# Patient Record
Sex: FEMALE
[De-identification: names, ages, dates, MRNs, and addresses within clinical notes are randomized per-mention and may not be internally consistent; named-entity substitution may affect disease eponyms.]

---

## 2021-04-09 ENCOUNTER — TRANSCRIPTION ENCOUNTER (OUTPATIENT)
Age: 56
End: 2021-04-09

## 2021-04-09 ENCOUNTER — APPOINTMENT (OUTPATIENT)
Dept: NEUROLOGY | Facility: CLINIC | Age: 56
End: 2021-04-09
Payer: COMMERCIAL

## 2021-04-09 VITALS
OXYGEN SATURATION: 98 % | SYSTOLIC BLOOD PRESSURE: 124 MMHG | DIASTOLIC BLOOD PRESSURE: 70 MMHG | WEIGHT: 136 LBS | HEART RATE: 64 BPM | BODY MASS INDEX: 25.03 KG/M2 | TEMPERATURE: 97.3 F | HEIGHT: 62 IN

## 2021-04-09 DIAGNOSIS — Z78.9 OTHER SPECIFIED HEALTH STATUS: ICD-10-CM

## 2021-04-09 DIAGNOSIS — Z82.0 FAMILY HISTORY OF EPILEPSY AND OTHER DISEASES OF THE NERVOUS SYSTEM: ICD-10-CM

## 2021-04-09 DIAGNOSIS — G44.40 DRUG-INDUCED HEADACHE, NOT ELSEWHERE CLASSIFIED, NOT INTRACTABLE: ICD-10-CM

## 2021-04-09 DIAGNOSIS — Z86.59 PERSONAL HISTORY OF OTHER MENTAL AND BEHAVIORAL DISORDERS: ICD-10-CM

## 2021-04-09 PROCEDURE — 99204 OFFICE O/P NEW MOD 45 MIN: CPT

## 2021-04-09 PROCEDURE — 99072 ADDL SUPL MATRL&STAF TM PHE: CPT

## 2021-04-09 NOTE — DISCUSSION/SUMMARY
[FreeTextEntry1] : 55-year-old woman who is here for initial consultation of chronic migraine without aura, medication overuse headache and perimenopausal migraine.  We had a lengthy discussion regarding prophylactic and abortive treatment.  Patient is not clear if she was on magnesium oxide in the past.  Patient will contact me after checking her medicine cabinet.  If she was unable to tolerate magnesium oxide will consider propranolol as prophylactic medication.  Patient will be started on sumatriptan as needed.  Side effects of the medication was discussed with the patient in detail.  Patient will follow up with me in a couple of weeks.\par \par I spent the time noted on the day of this patient encounter preparing for, providing and documenting the above E/M service and counseling and educate patient on differential, workup, disease course, and treatment/management. Education was provided to the patient during this encounter. All questions and concerns were answered and addressed in detail. The patient verbalized understanding and agreed to plan. Patient was advised to continue to monitor for neurologic symptoms and to notify my office or go to the nearest emergency room if there are any changes. Any orders/referrals and communications were provided as well. \par Side effects of the above medications were discussed in detail including but not limited to applicable black box warning and teratogenicity as appropriate. \par Patient was advised to bring previous records to my office. \par \par \par

## 2021-04-09 NOTE — HISTORY OF PRESENT ILLNESS
[FreeTextEntry1] : 55-year-old woman who is here for initial consultation of migraine without aura since her teens. \par Location right or left side of her head\par Quality pulsating or pressure\par Severity 2-10 out of 10\par Duration 1 hour if she takes her medication\par She has tried Tylenol or Advil however Excedrin usually helps her headaches.  If her severity is high she will constantly take the Excedrin\par Associated with nausea, photo phobia and phonophobia, no vomiting or TACs\par Frequency in 2019 was once every week with some correlation with her menses.  In 2000 patient experienced around 2 headaches a month.  About 3 months ago she started experiencing headache almost every day.  Patient may be perimenopausal.  No other triggers were identified.  Patient states that she has seen a previous neurologist who tried gabapentin 100 mg daily but it only partially helped

## 2021-04-09 NOTE — PHYSICAL EXAM
[General Appearance - Alert] : alert [Oriented To Time, Place, And Person] : oriented to person, place, and time [Person] : oriented to person [Place] : oriented to place [Time] : oriented to time [Short Term Intact] : short term memory intact [Fluency] : fluency intact [Current Events] : adequate knowledge of current events [Cranial Nerves Optic (II)] : visual acuity intact bilaterally,  visual fields full to confrontation, pupils equal round and reactive to light [Cranial Nerves Oculomotor (III)] : extraocular motion intact [Cranial Nerves Vestibulocochlear (VIII)] : hearing was intact bilaterally [Motor Tone] : muscle tone was normal in all four extremities [Cranial Nerves Accessory (XI - Cranial And Spinal)] : head turning and shoulder shrug symmetric [Motor Strength] : muscle strength was normal in all four extremities [Sensation Tactile Decrease] : light touch was intact [Abnormal Walk] : normal gait [Coordination - Dysmetria Impaired Finger-to-Nose Bilateral] : not present [1+] : Patella left 1+

## 2021-04-12 ENCOUNTER — TRANSCRIPTION ENCOUNTER (OUTPATIENT)
Age: 56
End: 2021-04-12

## 2021-04-13 ENCOUNTER — TRANSCRIPTION ENCOUNTER (OUTPATIENT)
Age: 56
End: 2021-04-13

## 2021-04-13 ENCOUNTER — APPOINTMENT (OUTPATIENT)
Dept: NEUROLOGY | Facility: CLINIC | Age: 56
End: 2021-04-13

## 2021-06-23 ENCOUNTER — APPOINTMENT (OUTPATIENT)
Dept: NEUROLOGY | Facility: CLINIC | Age: 56
End: 2021-06-23
Payer: COMMERCIAL

## 2021-06-23 VITALS — DIASTOLIC BLOOD PRESSURE: 60 MMHG | SYSTOLIC BLOOD PRESSURE: 120 MMHG | HEART RATE: 76 BPM

## 2021-06-23 PROCEDURE — 99214 OFFICE O/P EST MOD 30 MIN: CPT

## 2021-06-23 PROCEDURE — 99072 ADDL SUPL MATRL&STAF TM PHE: CPT

## 2021-06-23 RX ORDER — SUMATRIPTAN 25 MG/1
25 TABLET, FILM COATED ORAL
Qty: 1 | Refills: 1 | Status: DISCONTINUED | COMMUNITY
Start: 2021-04-09 | End: 2021-06-23

## 2021-06-23 RX ORDER — PROPRANOLOL HYDROCHLORIDE 60 MG/1
60 CAPSULE, EXTENDED RELEASE ORAL
Qty: 30 | Refills: 0 | Status: DISCONTINUED | COMMUNITY
Start: 2021-04-12 | End: 2021-06-23

## 2021-06-23 NOTE — PHYSICAL EXAM
[General Appearance - Alert] : alert [Oriented To Time, Place, And Person] : oriented to person, place, and time [Person] : oriented to person [Place] : oriented to place [Time] : oriented to time [Short Term Intact] : short term memory intact [Fluency] : fluency intact [Current Events] : adequate knowledge of current events [Cranial Nerves Optic (II)] : visual acuity intact bilaterally,  visual fields full to confrontation, pupils equal round and reactive to light [Cranial Nerves Oculomotor (III)] : extraocular motion intact [Cranial Nerves Vestibulocochlear (VIII)] : hearing was intact bilaterally [Cranial Nerves Accessory (XI - Cranial And Spinal)] : head turning and shoulder shrug symmetric [Motor Tone] : muscle tone was normal in all four extremities [Motor Strength] : muscle strength was normal in all four extremities [Sensation Tactile Decrease] : light touch was intact [Abnormal Walk] : normal gait [Coordination - Dysmetria Impaired Finger-to-Nose Bilateral] : not present [1+] : Patella left 1+ [FreeTextEntry5] : Tinel and Phalen were negative

## 2021-06-23 NOTE — HISTORY OF PRESENT ILLNESS
[FreeTextEntry1] : 55-year-old woman who is here for initial consultation of migraine without aura since her teens. \par Location right or left side of her head\par Quality pulsating or pressure\par Severity 2-10 out of 10\par Duration 1 hour if she takes her medication\par She has tried Tylenol or Advil however Excedrin usually helps her headaches.  If her severity is high she will constantly take the Excedrin\par Associated with nausea, photo phobia and phonophobia, no vomiting or TACs\par Frequency in 2019 was once every week with some correlation with her menses.  In 2000 patient experienced around 2 headaches a month.  About 3 months ago she started experiencing headache almost every day.  Patient may be perimenopausal.  No other triggers were identified.  Patient states that she has seen a previous neurologist who tried gabapentin 100 mg daily but it only partially helped\par \par Interval history: Patient is here for follow-up of her headache that did not respond to Imitrex.  Patient's headache returned after 2 hours and even after taking the second dose she did not experience complete relief.  Surprisingly Excedrin worked.  Since her last visit with me in April patient had 2 headaches.  Patient never started the propranolol because she does not want to take prophylactic medication.\par \par Patient states that she has at least a year history of numbness in the right hand that radiates to the forearm.  Patient points to the posterior aspect of her forearm.  Patient is right-handed.  The symptoms sometimes wakes her up at night and she feels that she needs to shake the hand for relief.  Patient does not experience any color or temperature changes.

## 2021-06-23 NOTE — DISCUSSION/SUMMARY
[FreeTextEntry1] : 55-year-old woman who is here for follow-up of her chronic migraine which did not respond to sumatriptan.  Patient will try rizatriptan as needed.  Side effects were discussed with the patient in detail.  Patient is not having the headache frequently enough to warrant prophylactic treatment.  Patient had new symptom of numbness in her right arm that has been going on since at least a year ago.  This is likely median neuropathy.  We will have her return for nerve conduction and EMG testing.\par \par I spent the time noted on the day of this patient encounter preparing for, providing and documenting the above E/M service and counseling and educate patient on differential, workup, disease course, and treatment/management. Education was provided to the patient during this encounter. All questions and concerns were answered and addressed in detail. The patient verbalized understanding and agreed to plan. Patient was advised to continue to monitor for neurologic symptoms and to notify my office or go to the nearest emergency room if there are any changes. Any orders/referrals and communications were provided as well. \par Side effects of the above medications were discussed in detail including but not limited to applicable black box warning and teratogenicity as appropriate. \par Patient was advised to bring previous records to my office. \par \par \par

## 2021-08-04 ENCOUNTER — APPOINTMENT (OUTPATIENT)
Dept: NEUROLOGY | Facility: CLINIC | Age: 56
End: 2021-08-04
Payer: COMMERCIAL

## 2021-08-04 PROCEDURE — 95886 MUSC TEST DONE W/N TEST COMP: CPT

## 2021-08-04 PROCEDURE — 95910 NRV CNDJ TEST 7-8 STUDIES: CPT

## 2021-08-04 NOTE — PROCEDURE
[FreeTextEntry1] :   \par [facility]\par [facility]\par Neurology and Electrophysiology\par \par Nerve Conduction & EMG Report\par \par \par   \par Full Name:	Ry Saini	Gender:	Female\par MRN:	[id number]	YOB: 1965\par   \par Visit Date:	8/4/2021 10:37\par Age:	55 Years\par Examining Physician:	Steve Louis MD\par Height:	5 feet 2 inch\par Weight:	136 lbs\par   \par ________________________________________\par \par Conclusion: \par \par Sensory nerve conductions\par Right median sensory nerve action potential had prolonged latency, normal amplitude and decreased conduction velocity.\par \par Left median sensory nerve action potential had normal latency, normal amplitude and decreased conduction velocity.\par \par Right ulnar sensory nerve action potential had normal latency, normal amplitude and normal conduction velocity.\par \par Right radial sensory nerve action potential had normal latency, normal amplitude and normal conduction velocity.\par Motor nerve conductions\par Right median nerve compound motor action potential had normal latency, normal amplitude and normal conduction velocity.\par \par Left median nerve compound motor action potential had normal latency, normal amplitude and normal conduction velocity.\par \par Right ulnar nerve compound motor action potential had normal latency, normal amplitude and normal conduction velocity.\par \par F wave latency for bilateral median nerves were within normal limits.\par Right ulnar f wave latency was within normal limits. \par Needle EMG\par Needle EMG was performed using a disposable concentric needle in the following muscles: \par Deltoid, biceps, edc, fdi, APB had no spontaneous activity and normal motor units. \par \par Summary: abnormal study. There is electrophysiologic evidence of right more than left median neuropathy by sensory criteria only. There is no evidence of large fiber neuropathy or cervical radiculopathy. She was told by previous neurologist that she has median neuropathy. She was advised to wear wrist splints (without metal backing) on a regular basis and followup in 4 months.  Clinical and radiologic correlation is advised. \par Thank you for the consult,\par Steve Louis MD\par Diplomat, American Board of Neurology and Psychiatry\par \par ________________________________________\par  \par    \par \par   \par Sensory NCS\par   \par Nerve / Sites	Rec. Site	Onset Lat	Peak Lat	NP Amp	PP Amp	Segments	Distance	Velocity\par 		ms	ms	µV	µV		cm	m/s\par R Median - Dig II (Antidromic)\par    Wrist	Index	3.40	4.33	27.0	47.9	Wrist - Index	11	32\par L Median - Dig II (Antidromic)\par    Wrist	Index	2.77	3.54	34.1	52.0	Wrist - Index	11	40\par R Ulnar - Dig V (Antidromic)\par    Wrist	Dig V	2.10	2.83	46.0	53.1	Wrist - Dig V	11	52\par R Radial - Superficial (Antidromic)\par    Forearm	Wrist	1.15	1.69	22.1	42.6	Forearm - Wrist	7	61\par   \par Motor NCS\par   \par Nerve / Sites	Muscle	Latency	Amplitude	Rel Amp	Segments	Distance	Lat Diff	Velocity	Durat\par 		ms	mV	%		cm	ms	m/s	ms\par R Median - APB\par    Wrist	APB	3.63	11.4		Wrist - APB	8			6.33\par    Elbow	APB	7.35	11.0	96.4	Elbow - Wrist	19	3.73	51	6.54\par L Median - APB\par    Wrist	APB	3.17	13.5		Wrist - APB	8			6.73\par    Elbow	APB	7.00	13.4	99.2	Elbow - Wrist	20	3.83	52	7.02\par R Ulnar - ADM\par    Wrist	ADM	2.00	12.9	100	Wrist - ADM	8			6.88\par    B.Elbow	ADM	5.65	12.9	100	B.Elbow - Wrist	20	3.65	55	7.19\par    A.Elbow	ADM	7.38	12.8	98.8	A.Elbow - B.Elbow	9	1.73	52	7.19\par   \par F  Wave\par   \par Nerve	F min\par 	ms\par R Median - APB	25.8\par R Ulnar - ADM	25.2\par L Median - APB	26.6\par   \par EMG Summary Table	\par 	Spontaneous	MUAP	Recruitment\par Muscle	Nerve	Roots	IA	Fib	PSW	Fasc	Comments	Amp	Dur.	PPP	Pattern\par R. Deltoid	Axillary	C5-C6	N	None	None	None	None	N	N	N	N\par R. Biceps brachii	Musculocutaneous	C5-C6	N	None	None	None	None	N	N	N	N\par R. Extensor digitorum communis	Radial	C7-C8	N	None	None	None	None	N	N	N	N\par R. First dorsal interosseous	Ulnar	C8-T1	N	None	None	None	None	N	N	N	N\par R. Abductor pollicis brevis	Median	C8-T1	N	None	None	None	None	N	N	N	N\par                               \par  \par

## 2021-12-06 ENCOUNTER — APPOINTMENT (OUTPATIENT)
Dept: NEUROLOGY | Facility: CLINIC | Age: 56
End: 2021-12-06
Payer: COMMERCIAL

## 2021-12-06 VITALS
DIASTOLIC BLOOD PRESSURE: 82 MMHG | WEIGHT: 136 LBS | BODY MASS INDEX: 25.03 KG/M2 | HEART RATE: 59 BPM | SYSTOLIC BLOOD PRESSURE: 125 MMHG | HEIGHT: 62 IN

## 2021-12-06 DIAGNOSIS — G43.829 MENSTRUAL MIGRAINE, NOT INTRACTABLE, W/OUT STATUS MIGRAINOSUS: ICD-10-CM

## 2021-12-06 DIAGNOSIS — S54.22XA INJURY OF RADIAL NERVE AT FOREARM LEVEL, LEFT ARM, INITIAL ENCOUNTER: ICD-10-CM

## 2021-12-06 PROCEDURE — 99214 OFFICE O/P EST MOD 30 MIN: CPT

## 2021-12-06 NOTE — HISTORY OF PRESENT ILLNESS
[FreeTextEntry1] : 56-year-old woman who is here for initial consultation of migraine without aura since her teens. \par Location right or left side of her head\par Quality pulsating or pressure\par Severity 2-10 out of 10\par Duration 1 hour if she takes her medication\par She has tried Tylenol or Advil however Excedrin usually helps her headaches.  If her severity is high she will constantly take the Excedrin\par Associated with nausea, photo phobia and phonophobia, no vomiting or TACs\par Frequency in 2019 was once every week with some correlation with her menses.  In 2000 patient experienced around 2 headaches a month.  About 3 months ago she started experiencing headache almost every day.  Patient may be perimenopausal.  No other triggers were identified.  Patient states that she has seen a previous neurologist who tried gabapentin 100 mg daily but it only partially helped\par \par Interval history: Patient is here for follow-up of her headache that did not respond to Imitrex.  Patient's headache returned after 2 hours and even after taking the second dose she did not experience complete relief.  Surprisingly Excedrin worked.  Since her last visit with me in April patient had 2 headaches.  Patient never started the propranolol because she does not want to take prophylactic medication.\par \par Patient states that she has at least a year history of numbness in the right hand that radiates to the forearm.  Patient points to the posterior aspect of her forearm.  Patient is right-handed.  The symptoms sometimes wakes her up at night and she feels that she needs to shake the hand for relief.  Patient does not experience any color or temperature changes.\par \par Interval history: Patient states her headaches are better even without trying the rizatriptan.  Patient still has symptoms of carpal tunnel in her right hand.  Patient states that she wears the wrist splints and there are still episodes of numbness whenever she is holding things.  Patient reports no change in temperature or color of her hand when this occurs.  Patient also has symptoms located in the radial distribution on the left hand.  Patient wears a watch with the metal wristband.  Patient was advised to switch to the rubber wrist band to see if that may help her symptoms.

## 2021-12-06 NOTE — DISCUSSION/SUMMARY
[FreeTextEntry1] : 56-year-old woman who is here for follow-up of her chronic migraine and menstrual migraine which has improved.  Patient will have rizatriptan on hand if it does get worse.  Patient has clinical diagnosis of left radial neuropathy.  Patient will wear a different wrist band to see if that will help her symptoms.  Patient will also return for nerve conduction and EMG studies of the right median nerve to evaluate for any progression since her last study in August as her splints are not helping.  Patient will need referral to hand specialist depending on the results of the testing.\par \par I spent the time noted on the day of this patient encounter preparing for, providing and documenting the above E/M service and counseling and educate patient on differential, workup, disease course, and treatment/management. Education was provided to the patient during this encounter. All questions and concerns were answered and addressed in detail. The patient verbalized understanding and agreed to plan. Patient was advised to continue to monitor for neurologic symptoms and to notify my office or go to the nearest emergency room if there are any changes. Any orders/referrals and communications were provided as well. \par Side effects of the above medications were discussed in detail including but not limited to applicable black box warning and teratogenicity as appropriate. \par Patient was advised to bring previous records to my office. \par \par \par

## 2022-03-25 ENCOUNTER — APPOINTMENT (OUTPATIENT)
Dept: NEUROLOGY | Facility: CLINIC | Age: 57
End: 2022-03-25

## 2022-06-09 PROBLEM — Z00.00 ENCOUNTER FOR PREVENTIVE HEALTH EXAMINATION: Status: ACTIVE | Noted: 2022-06-09

## 2022-07-08 ENCOUNTER — APPOINTMENT (OUTPATIENT)
Dept: NEUROLOGY | Facility: CLINIC | Age: 57
End: 2022-07-08

## 2022-07-08 DIAGNOSIS — R20.0 ANESTHESIA OF SKIN: ICD-10-CM

## 2022-07-08 PROCEDURE — 95909 NRV CNDJ TST 5-6 STUDIES: CPT

## 2022-07-08 PROCEDURE — 95886 MUSC TEST DONE W/N TEST COMP: CPT

## 2022-07-08 NOTE — PROCEDURE
[FreeTextEntry1] :   \par                                                                       \par \par Clinical \par Neurophysiology \par Laboratory\par [address] [facility]\[place] [address]\par \par EMG/NCV REPORT\par   \par Full Name:	mathieu sampson	YOB: 1965\par Gender:	Female\par   \par Visit Date:	7/8/2022 12:43\par Age:	56 Years 9 Months Old\par Examining Physician:	[email or web address]\[email or web address] Referring Physician:	shelley\prashant Height:	5 feet 2 inch\par   \par Summary\par Sensory nerve conductions\par Right median sensory nerve action potential had prolonged latency, normal amplitude and decreased conduction velocity.  This is about the same as her previous study in 2021.\par \par Left median sensory nerve action potential had normal latency, normal amplitude and decreased conduction velocity.  This is slightly improved when compared to her previous study.\par \par Right ulnar sensory nerve action potential had normal latency, normal amplitude and normal conduction velocity.\par \par Motor nerve conductions\par \par Bilateral median nerve compound motor action potential had normal latency, normal amplitude and normal conduction velocity.\par \par F-wave latency of bilateral median nerves were within normal limits.\par \par Needle EMG\par Needle EMG was deferred. \par \par Summary: There is electrophysiologic evidence of median neuropathy by sensory criteria in both the right and left sides.  The right and seems to be more affected than the left.  As per patient her symptoms have improved and she was encouraged to start wearing wrist splints at night.  She will follow-up with me in a couple of months.\par \par Clinical and radiologic correlation is advised. \par Thank you for the consult,\par Steve Louis MD\par \par   \par Sensory NCS\par   \par Nerve / Sites	Rec. Site	Onset Lat	Peak Lat	NP Amp	PP Amp	Segments	Distance	Velocity\par 		ms	ms	µV	µV		cm	m/s\par R Median - Digit II (Antidromic)\par    Wrist	Dig II	3.33	4.22	24.9	46.1	Wrist - Dig II	12	36\par L Median - Digit II (Antidromic)\par    Wrist	Dig II	2.60	3.39	45.1	62.3	Wrist - Dig II	13	50\par R Ulnar - Digit V (Antidromic)\par    Wrist	Dig V	1.77	2.40	36.9	51.4	Wrist - Dig V	11	62\par            \par Motor NCS\par   \par Nerve / Sites	Muscle	Latency	Amplitude	Amp %	Duration	Segments	Distance	Lat Diff	Velocity\par 		ms	mV	%	ms		cm	ms	m/s\par R Median - APB\par    Wrist	APB	3.13	13.9	100	6.98	Wrist - APB	7		\par    Elbow	APB	7.34	7.1	51.2	7.14	Elbow - Wrist	21	4.22	50\par L Median - APB\par    Wrist	APB	3.18	13.8	100	6.20	Wrist - APB	7		\par    Elbow	APB	6.67	13.0	94.4	6.61	Elbow - Wrist	29	3.49	83\par         \par F  Wave\par   \par Nerve	F Lat	M Lat	F-M Lat\par 	ms	ms	ms\par R Median - APB	25.3	3.8	21.5\par L Median - APB	24.9	2.7	22.2\par       \par  \par

## 2022-07-11 ENCOUNTER — RX RENEWAL (OUTPATIENT)
Age: 57
End: 2022-07-11

## 2022-07-11 RX ORDER — RIZATRIPTAN BENZOATE 10 MG/1
10 TABLET ORAL
Qty: 12 | Refills: 3 | Status: ACTIVE | COMMUNITY
Start: 2021-06-23 | End: 1900-01-01

## 2024-01-30 ENCOUNTER — NON-APPOINTMENT (OUTPATIENT)
Age: 59
End: 2024-01-30

## 2024-01-30 ENCOUNTER — APPOINTMENT (OUTPATIENT)
Dept: INTERNAL MEDICINE | Facility: CLINIC | Age: 59
End: 2024-01-30
Payer: COMMERCIAL

## 2024-01-30 VITALS
DIASTOLIC BLOOD PRESSURE: 75 MMHG | HEIGHT: 62 IN | SYSTOLIC BLOOD PRESSURE: 124 MMHG | HEART RATE: 67 BPM | BODY MASS INDEX: 23.55 KG/M2 | WEIGHT: 128 LBS

## 2024-01-30 DIAGNOSIS — M77.8 OTHER ENTHESOPATHIES, NOT ELSEWHERE CLASSIFIED: ICD-10-CM

## 2024-01-30 DIAGNOSIS — R32 UNSPECIFIED URINARY INCONTINENCE: ICD-10-CM

## 2024-01-30 DIAGNOSIS — Z82.49 FAMILY HISTORY OF ISCHEMIC HEART DISEASE AND OTHER DISEASES OF THE CIRCULATORY SYSTEM: ICD-10-CM

## 2024-01-30 PROCEDURE — 99386 PREV VISIT NEW AGE 40-64: CPT

## 2024-01-30 PROCEDURE — 36415 COLL VENOUS BLD VENIPUNCTURE: CPT

## 2024-01-30 PROCEDURE — 93000 ELECTROCARDIOGRAM COMPLETE: CPT

## 2024-01-30 RX ORDER — FAMOTIDINE 40 MG/1
40 TABLET, FILM COATED ORAL
Qty: 30 | Refills: 1 | Status: ACTIVE | COMMUNITY
Start: 2024-01-30

## 2024-02-01 NOTE — ASSESSMENT
[FreeTextEntry1] : //    Hyperlipidemia  -Will check labs  -Advised decrease greasy, fatty foods, increase exercise, fiber intake  -Elevated cholesterol has been linked to increase in cardiovascular even such as heart attack, stroke, peripheral artery disease and death  urinary incontinence, stress induced  -pelvis floor exercises   golfers elbow  -Rest, ice  -ortho referral   Healthcare Maintenance -Advise Yearly Skin cancer screening with Dermatologist  -Advise Yearly Eye exam with Ophthalmologist -Advise Yearly Dental exam -Educated of the importance of Healthy diet, such as Mediterranean Diet and Exercise, such as walking >20 minutes a day and increasing gradually as tolerated  Immunizations -Flu vaccine  -Covid vaccine  -Discussed shingles vaccine (shingrix), advised to verify with insurance if its covered through medical or prescription plan  Preventative screening  -advised to get PAP for cervical cancer screening -up to date  -advised to get mammogram for breast cancer screening -up to date  -advised to get bone density for osteoporosis screening -referral given  -advised to get colonoscopy for colon cancer screening -up to date

## 2024-02-01 NOTE — HEALTH RISK ASSESSMENT
[Good] : ~his/her~  mood as  good [No falls in past year] : Patient reported no falls in the past year [0] : 2) Feeling down, depressed, or hopeless: Not at all (0) [PHQ-2 Negative - No further assessment needed] : PHQ-2 Negative - No further assessment needed [Patient reported colonoscopy was normal] : Patient reported colonoscopy was normal [Never] : Never [[id number]] : 0 [MammogramDate] : 12/2023 [PapSmearDate] : 2023 [BoneDensityDate] : never [ColonoscopyDate] : 2017

## 2024-02-01 NOTE — HISTORY OF PRESENT ILLNESS
[de-identified] : 58 year old female presents for initial annual exam.  overall is doing okay.      employed  non-smoker

## 2024-02-02 ENCOUNTER — CLINICAL ADVICE (OUTPATIENT)
Age: 59
End: 2024-02-02

## 2024-02-02 LAB
ALBUMIN SERPL ELPH-MCNC: 4.4 G/DL
ALP BLD-CCNC: 60 U/L
ALT SERPL-CCNC: 31 U/L
ANION GAP SERPL CALC-SCNC: 10 MMOL/L
APPEARANCE: CLEAR
AST SERPL-CCNC: 27 U/L
BACTERIA: ABNORMAL /HPF
BASOPHILS # BLD AUTO: 0.06 K/UL
BASOPHILS NFR BLD AUTO: 1.2 %
BILIRUB DIRECT SERPL-MCNC: 0.1 MG/DL
BILIRUB INDIRECT SERPL-MCNC: 0.2 MG/DL
BILIRUB SERPL-MCNC: 0.4 MG/DL
BILIRUBIN URINE: NEGATIVE
BLOOD URINE: NEGATIVE
BUN SERPL-MCNC: 10 MG/DL
CALCIUM SERPL-MCNC: 9.8 MG/DL
CAST: 0 /LPF
CHLORIDE SERPL-SCNC: 103 MMOL/L
CHOLEST SERPL-MCNC: 235 MG/DL
CO2 SERPL-SCNC: 28 MMOL/L
COLOR: YELLOW
CREAT SERPL-MCNC: 0.66 MG/DL
EGFR: 103 ML/MIN/1.73M2
EOSINOPHIL # BLD AUTO: 0.06 K/UL
EOSINOPHIL NFR BLD AUTO: 1.2 %
EPITHELIAL CELLS: 9 /HPF
ESTIMATED AVERAGE GLUCOSE: 120 MG/DL
FERRITIN SERPL-MCNC: 91 NG/ML
FOLATE SERPL-MCNC: 8.6 NG/ML
GLUCOSE QUALITATIVE U: 100 MG/DL
GLUCOSE SERPL-MCNC: 68 MG/DL
HBA1C MFR BLD HPLC: 5.8 %
HCT VFR BLD CALC: 45.6 %
HCV AB SER QL: NONREACTIVE
HCV S/CO RATIO: 0.13 S/CO
HDLC SERPL-MCNC: 73 MG/DL
HGB BLD-MCNC: 14.9 G/DL
HIV1+2 AB SPEC QL IA.RAPID: NONREACTIVE
IMM GRANULOCYTES NFR BLD AUTO: 0.4 %
IRON SATN MFR SERPL: 27 %
IRON SERPL-MCNC: 91 UG/DL
KETONES URINE: NEGATIVE MG/DL
LDLC SERPL CALC-MCNC: 142 MG/DL
LEUKOCYTE ESTERASE URINE: NEGATIVE
LYMPHOCYTES # BLD AUTO: 2.12 K/UL
LYMPHOCYTES NFR BLD AUTO: 42 %
MAN DIFF?: NORMAL
MCHC RBC-ENTMCNC: 29.3 PG
MCHC RBC-ENTMCNC: 32.7 GM/DL
MCV RBC AUTO: 89.8 FL
MICROSCOPIC-UA: NORMAL
MONOCYTES # BLD AUTO: 0.54 K/UL
MONOCYTES NFR BLD AUTO: 10.7 %
NEUTROPHILS # BLD AUTO: 2.25 K/UL
NEUTROPHILS NFR BLD AUTO: 44.5 %
NITRITE URINE: NEGATIVE
NONHDLC SERPL-MCNC: 162 MG/DL
PH URINE: 6
PLATELET # BLD AUTO: 246 K/UL
POTASSIUM SERPL-SCNC: 4.5 MMOL/L
PROT SERPL-MCNC: 7.6 G/DL
PROTEIN URINE: NEGATIVE MG/DL
RBC # BLD: 5.08 M/UL
RBC # FLD: 13.5 %
RED BLOOD CELLS URINE: 1 /HPF
SODIUM SERPL-SCNC: 140 MMOL/L
SPECIFIC GRAVITY URINE: 1.02
TIBC SERPL-MCNC: 340 UG/DL
TRIGL SERPL-MCNC: 115 MG/DL
TSH SERPL-ACNC: 1.86 UIU/ML
UIBC SERPL-MCNC: 250 UG/DL
UROBILINOGEN URINE: 0.2 MG/DL
VIT B12 SERPL-MCNC: 606 PG/ML
WBC # FLD AUTO: 5.05 K/UL
WHITE BLOOD CELLS URINE: 3 /HPF

## 2024-02-02 RX ORDER — ROSUVASTATIN CALCIUM 5 MG/1
5 TABLET, FILM COATED ORAL
Refills: 0 | Status: ACTIVE | COMMUNITY

## 2024-02-06 ENCOUNTER — TRANSCRIPTION ENCOUNTER (OUTPATIENT)
Age: 59
End: 2024-02-06

## 2024-02-06 DIAGNOSIS — Z91.013 ALLERGY TO SEAFOOD: ICD-10-CM

## 2024-02-06 DIAGNOSIS — Z00.00 ENCOUNTER FOR GENERAL ADULT MEDICAL EXAMINATION W/OUT ABNORMAL FINDINGS: ICD-10-CM

## 2024-02-06 RX ORDER — ALPRAZOLAM 0.25 MG/1
0.25 TABLET ORAL TWICE DAILY
Qty: 30 | Refills: 0 | Status: ACTIVE | COMMUNITY
Start: 1900-01-01 | End: 1900-01-01

## 2024-02-06 RX ORDER — EPINEPHRINE 0.3 MG/.3ML
0.3 INJECTION INTRAMUSCULAR
Qty: 1 | Refills: 1 | Status: ACTIVE | COMMUNITY
Start: 1900-01-01 | End: 1900-01-01

## 2024-02-08 ENCOUNTER — APPOINTMENT (OUTPATIENT)
Dept: ORTHOPEDIC SURGERY | Facility: CLINIC | Age: 59
End: 2024-02-08
Payer: COMMERCIAL

## 2024-02-08 ENCOUNTER — NON-APPOINTMENT (OUTPATIENT)
Age: 59
End: 2024-02-08

## 2024-02-08 VITALS — BODY MASS INDEX: 23.92 KG/M2 | HEIGHT: 62 IN | WEIGHT: 130 LBS

## 2024-02-08 DIAGNOSIS — G56.01 CARPAL TUNNEL SYNDROME, RIGHT UPPER LIMB: ICD-10-CM

## 2024-02-08 PROCEDURE — 99203 OFFICE O/P NEW LOW 30 MIN: CPT | Mod: 25

## 2024-02-08 PROCEDURE — 20526 THER INJECTION CARP TUNNEL: CPT | Mod: RT

## 2024-02-08 NOTE — DISCUSSION/SUMMARY
[FreeTextEntry1] :  The underlying pathophysiology was reviewed with the patient. XR films were reviewed with the patient. Discussed at length the nature of the patient's condition.   Patient was advised to take OTC medications and topical analgesic for pain management. She was also advised the option of cortisone injection if the symptom is worse.   The patient wishes to proceed with a cortisone injection at this time. The skin was prepped with alcohol and sprayed with Ethyl Chloride. An injection of 0.5 cc 1% Lidocaine without epinephrine, 0.25 cc Kenalog 40 mg, and 0.25 cc Dexamethasone was administered into the RIGHT carpal tunnel (#1). The patient tolerated procedure well. Apply ice.  All questions answered, understanding verbalized. Patient in agreement with plan of care.   Patient was advised to follow up as needed.

## 2024-02-08 NOTE — HISTORY OF PRESENT ILLNESS
[Right] : right hand dominant [FreeTextEntry1] : Pt is a 59 y/o female c/o right hand numbness and tingling. She states that the symptoms have been present for years.  It is intermittent but frequent.  She states that the entire hand feels numb.  It is worse when she lays in bed at night.  It wakes her from sleep.  She states that it is exacerbated when she uses her hand or lifts her arm overhead.  She had an EMG in 2022 which showed bilateral CTS.  She has not had an EMG or cortisone injection.

## 2024-02-08 NOTE — ADDENDUM
[FreeTextEntry1] :  I, Familia Falk wrote this note acting as a scribe for Dr. Leonard Hahn on Feb 08, 2024.

## 2024-02-08 NOTE — END OF VISIT
[FreeTextEntry3] :  All medical record entries made by the Scribe were at my,  Dr. Leonard Hahn MD., direction and personally dictated by me on 02/08/2024. I have personally reviewed the chart and agree that the record accurately reflects my personal performance of the history, physical exam, assessment and plan.

## 2024-02-08 NOTE — PHYSICAL EXAM
[de-identified] :  Patient is WDWN, alert, and in no acute distress. Breathing is unlabored. She is grossly oriented to person, place, and time.  Right Wrist:   No tenderness, edema, or deformities. No thenar atrophy. Full ROM with decreased sensation along median nerve distribution. Tests/Signs: Tinel's sign is negative over carpal tunnel, Phalen's test is positive.   Left Wrist:   No tenderness, edema, or deformities. No thenar atrophy. Full ROM with decreased sensation along median nerve distribution. Tests/Signs: Tinel's sign is negative over carpal tunnel, Phalen's test is positive. [de-identified] : The EMG test results were obtained and revealed median neuropathy by sensory criteria in both the right and left sides. The right seems to be more affected than the left.

## 2024-04-04 ENCOUNTER — CLINICAL ADVICE (OUTPATIENT)
Age: 59
End: 2024-04-04

## 2024-04-04 ENCOUNTER — TRANSCRIPTION ENCOUNTER (OUTPATIENT)
Age: 59
End: 2024-04-04

## 2024-04-04 ENCOUNTER — APPOINTMENT (OUTPATIENT)
Dept: RADIOLOGY | Facility: CLINIC | Age: 59
End: 2024-04-04
Payer: COMMERCIAL

## 2024-04-04 PROCEDURE — 77080 DXA BONE DENSITY AXIAL: CPT

## 2024-05-06 ENCOUNTER — APPOINTMENT (OUTPATIENT)
Dept: INTERNAL MEDICINE | Facility: CLINIC | Age: 59
End: 2024-05-06
Payer: COMMERCIAL

## 2024-05-06 VITALS
DIASTOLIC BLOOD PRESSURE: 72 MMHG | SYSTOLIC BLOOD PRESSURE: 122 MMHG | WEIGHT: 131 LBS | HEIGHT: 62 IN | HEART RATE: 56 BPM | BODY MASS INDEX: 24.11 KG/M2 | OXYGEN SATURATION: 97 %

## 2024-05-06 DIAGNOSIS — E78.5 HYPERLIPIDEMIA, UNSPECIFIED: ICD-10-CM

## 2024-05-06 DIAGNOSIS — R73.03 PREDIABETES.: ICD-10-CM

## 2024-05-06 PROCEDURE — 99214 OFFICE O/P EST MOD 30 MIN: CPT

## 2024-05-06 PROCEDURE — G2211 COMPLEX E/M VISIT ADD ON: CPT | Mod: NC,1L

## 2024-05-06 NOTE — ASSESSMENT
[FreeTextEntry1] : //    Pre-DM, last a1c- 5.8 -Educated of the importance of Healthy diet, such as Mediterranean Diet and Exercise, such as walking >20 minutes a day and increasing gradually as tolerated   Hyperlipidemia  -cont crestor as directed  -Will check labs  -Advised decrease greasy, fatty foods, increase exercise, fiber intake  -Elevated cholesterol has been linked to increase in cardiovascular even such as heart attack, stroke, peripheral artery disease and death  urinary incontinence, stress induced  -pelvis floor exercises   carpel tunnel s/p injection with some relief  -night splint

## 2024-05-06 NOTE — PHYSICAL EXAM
[Normal] : normal rate, regular rhythm, normal S1 and S2 and no murmur heard [Pedal Pulses Present] : the pedal pulses are present [No Edema] : there was no peripheral edema [No Extremity Clubbing/Cyanosis] : no extremity clubbing/cyanosis [Soft] : abdomen soft [Non Tender] : non-tender [Non-distended] : non-distended [No HSM] : no HSM [No Focal Deficits] : no focal deficits [Normal Affect] : the affect was normal [Normal Mood] : the mood was normal

## 2024-05-06 NOTE — HISTORY OF PRESENT ILLNESS
[de-identified] : 58 year old female with h/o hyperlipidemia, Pre-DM presents for follow-up.    overall is doing okay.   takes statin, has been more compliant after discussion with cardiologist Dr Bunch    employed  non-smoker

## 2024-05-13 DIAGNOSIS — T14.8XXA OTHER INJURY OF UNSPECIFIED BODY REGION, INITIAL ENCOUNTER: ICD-10-CM

## 2024-05-13 RX ORDER — MELOXICAM 7.5 MG/1
7.5 TABLET ORAL
Qty: 14 | Refills: 0 | Status: ACTIVE | COMMUNITY
Start: 2024-05-13 | End: 1900-01-01

## 2024-05-24 ENCOUNTER — APPOINTMENT (OUTPATIENT)
Dept: ORTHOPEDIC SURGERY | Facility: CLINIC | Age: 59
End: 2024-05-24
Payer: COMMERCIAL

## 2024-05-24 VITALS
BODY MASS INDEX: 24.48 KG/M2 | WEIGHT: 133 LBS | SYSTOLIC BLOOD PRESSURE: 131 MMHG | DIASTOLIC BLOOD PRESSURE: 84 MMHG | HEIGHT: 62 IN

## 2024-05-24 PROCEDURE — 72050 X-RAY EXAM NECK SPINE 4/5VWS: CPT

## 2024-05-24 PROCEDURE — 99204 OFFICE O/P NEW MOD 45 MIN: CPT

## 2024-05-24 RX ORDER — DICLOFENAC SODIUM 75 MG/1
75 TABLET, DELAYED RELEASE ORAL
Qty: 40 | Refills: 0 | Status: ACTIVE | COMMUNITY
Start: 2024-05-24 | End: 1900-01-01

## 2024-05-24 RX ORDER — TIZANIDINE 2 MG/1
2 TABLET ORAL
Qty: 24 | Refills: 0 | Status: ACTIVE | COMMUNITY
Start: 2024-05-24 | End: 1900-01-01

## 2024-05-24 NOTE — PHYSICAL EXAM
[de-identified] : Cervical Physical Exam:  Gait - Normal  Station- Normal  Sagittal Balance- Normal  Compensatory mechanism - none  Horizontal Gaze- Maintained  Heel walk- Normal  Toe walk- Normal  Reflexes: Biceps- Normal Triceps- Normal Brachioradialis- Normal Patellar- Normal Gastroc- Normal Clonus- No  Saenz's- None  Shoulder Exam - Normal  Spurling's- None  Wrist Pulses- 2+ radial/ulnar  Cervical range of motion- significantly reduced  Sensation C5-T1 sensation intact to light touch bilaterally  L1-S1 sensation intact to light touch bilaterally  Motor                  Right/Left - Deltoid     5/5 - Biceps     5/5 - Triceps    3+/5 - WF          5/5 - WE          5/5 - IO            5/5 -         3+/5  - IP             5/5 - Quad       5/5 - HS           5/5 - TA           5/5 - Gastroc   5/5 - EHL          5/5 [de-identified] : Xray cervical 4 views Degenerative disc disease noted Facet arthropathy

## 2024-05-24 NOTE — HISTORY OF PRESENT ILLNESS
[de-identified] : 57 yo female presents with neck pain that radiates to her right upper extremity.  She does state that her pain is better when her arms above her head.  It does go down her arm past her elbow into her hand.  She denies any bowel bladder issues.  She denies any saddle anesthesia.  The symptoms been going on for 2+ weeks.

## 2024-05-24 NOTE — ASSESSMENT
[FreeTextEntry1] : I had a lengthy discussion with the patient in regards to the treatment plan and diagnosis.  The patient does have objective weakness findings on my exam.  Furthermore I am concerned in regards to compression along the patient's spinal cord and/or nerve roots.  As a result I would like to proceed with a MRI of the patient's cervical spine.  In tandem with this the patient should begin physical therapy focused on their neck.  I will have the patient follow-up in 3 to 4 weeks for repeat clinical evaluation.  I encouraged the patient to reach out to me directly at any point if their symptoms worsen or change in any way.  The patient has tried the following treatments: Activity modification          + Ice/Compression                  + Braces                                     - NSAIDS                                   + Physical Therapy                   +  Please note the above modalities been tried for over 6+ weeks over the past 2 months

## 2024-05-28 ENCOUNTER — CLINICAL ADVICE (OUTPATIENT)
Age: 59
End: 2024-05-28

## 2024-05-28 LAB
ALBUMIN SERPL ELPH-MCNC: 4.5 G/DL
ALP BLD-CCNC: 55 U/L
ALT SERPL-CCNC: 34 U/L
ANION GAP SERPL CALC-SCNC: 10 MMOL/L
AST SERPL-CCNC: 27 U/L
BASOPHILS # BLD AUTO: 0.05 K/UL
BASOPHILS NFR BLD AUTO: 1.2 %
BILIRUB DIRECT SERPL-MCNC: 0.1 MG/DL
BILIRUB INDIRECT SERPL-MCNC: 0.3 MG/DL
BILIRUB SERPL-MCNC: 0.4 MG/DL
BUN SERPL-MCNC: 15 MG/DL
CALCIUM SERPL-MCNC: 9.4 MG/DL
CHLORIDE SERPL-SCNC: 103 MMOL/L
CHOLEST SERPL-MCNC: 156 MG/DL
CO2 SERPL-SCNC: 26 MMOL/L
CREAT SERPL-MCNC: 0.77 MG/DL
EGFR: 90 ML/MIN/1.73M2
EOSINOPHIL # BLD AUTO: 0.11 K/UL
EOSINOPHIL NFR BLD AUTO: 2.6 %
ESTIMATED AVERAGE GLUCOSE: 128 MG/DL
GLUCOSE SERPL-MCNC: 103 MG/DL
HBA1C MFR BLD HPLC: 6.1 %
HCT VFR BLD CALC: 45.9 %
HDLC SERPL-MCNC: 67 MG/DL
HGB BLD-MCNC: 14.9 G/DL
IMM GRANULOCYTES NFR BLD AUTO: 0.2 %
LDLC SERPL CALC-MCNC: 74 MG/DL
LYMPHOCYTES # BLD AUTO: 1.99 K/UL
LYMPHOCYTES NFR BLD AUTO: 46.4 %
MAN DIFF?: NORMAL
MCHC RBC-ENTMCNC: 30 PG
MCHC RBC-ENTMCNC: 32.5 GM/DL
MCV RBC AUTO: 92.4 FL
MONOCYTES # BLD AUTO: 0.41 K/UL
MONOCYTES NFR BLD AUTO: 9.6 %
NEUTROPHILS # BLD AUTO: 1.72 K/UL
NEUTROPHILS NFR BLD AUTO: 40 %
NONHDLC SERPL-MCNC: 89 MG/DL
PLATELET # BLD AUTO: 199 K/UL
POTASSIUM SERPL-SCNC: 4.4 MMOL/L
PROT SERPL-MCNC: 7.3 G/DL
RBC # BLD: 4.97 M/UL
RBC # FLD: 13 %
SODIUM SERPL-SCNC: 140 MMOL/L
TRIGL SERPL-MCNC: 79 MG/DL
TSH SERPL-ACNC: 1.94 UIU/ML
WBC # FLD AUTO: 4.29 K/UL

## 2024-06-28 ENCOUNTER — APPOINTMENT (OUTPATIENT)
Dept: ORTHOPEDIC SURGERY | Facility: CLINIC | Age: 59
End: 2024-06-28
Payer: COMMERCIAL

## 2024-06-28 VITALS
BODY MASS INDEX: 24.48 KG/M2 | WEIGHT: 133 LBS | HEART RATE: 57 BPM | HEIGHT: 62 IN | SYSTOLIC BLOOD PRESSURE: 122 MMHG | DIASTOLIC BLOOD PRESSURE: 82 MMHG

## 2024-06-28 DIAGNOSIS — M54.2 CERVICALGIA: ICD-10-CM

## 2024-06-28 PROCEDURE — 99214 OFFICE O/P EST MOD 30 MIN: CPT

## 2024-08-23 ENCOUNTER — APPOINTMENT (OUTPATIENT)
Dept: ORTHOPEDIC SURGERY | Facility: CLINIC | Age: 59
End: 2024-08-23

## 2024-09-10 DIAGNOSIS — R32 UNSPECIFIED URINARY INCONTINENCE: ICD-10-CM

## 2024-09-25 ENCOUNTER — APPOINTMENT (OUTPATIENT)
Dept: INTERNAL MEDICINE | Facility: CLINIC | Age: 59
End: 2024-09-25
Payer: COMMERCIAL

## 2024-09-25 DIAGNOSIS — N39.0 URINARY TRACT INFECTION, SITE NOT SPECIFIED: ICD-10-CM

## 2024-09-25 PROCEDURE — 99213 OFFICE O/P EST LOW 20 MIN: CPT

## 2024-09-25 RX ORDER — CEPHALEXIN 500 MG/1
500 TABLET ORAL
Qty: 14 | Refills: 0 | Status: ACTIVE | COMMUNITY
Start: 2024-09-25 | End: 1900-01-01

## 2024-09-25 NOTE — HISTORY OF PRESENT ILLNESS
[Home] : at home, [unfilled] , at the time of the visit. [Medical Office: ([facility])___] : at the medical office located in  [Verbal consent obtained from patient] : the patient, [unfilled] [de-identified] : 59 year old female presents complaining of urinary frequency, burning.  States took over-the-counter counter urinalysis test which showed positive leukocytes, nitrites.  Denies any fever, chills, nausea, vomiting or back pain.  States will be traveling to [place] tomorrow for several weeks

## 2024-09-25 NOTE — PHYSICAL EXAM
[de-identified] : limited due to telehealth.  speaking in complete sentences in no acute distress

## 2024-09-25 NOTE — ASSESSMENT
[FreeTextEntry1] : //  59 year old female presents complaining of urinary frequency, burning.  Likely related to uncomplicated UTI states took over-the-counter counter urinalysis test which showed positive leukocytes, nitrites.  Denies any fever, chills, nausea, vomiting or back pain.  States will be traveling to [place] tomorrow for several weeks -Recommend dropping off urine sample for urine culture and in event we do have to change antibiotic while she is overseas. -Start Keflex as directed, risks and benefits of medication discussed in detail  -will send urine culture, results in 2-5 days,  -advised to increase fluid hydration

## 2024-09-27 ENCOUNTER — CLINICAL ADVICE (OUTPATIENT)
Age: 59
End: 2024-09-27

## 2024-09-27 LAB
APPEARANCE: ABNORMAL
BACTERIA: ABNORMAL /HPF
BILIRUBIN URINE: NEGATIVE
BLOOD URINE: ABNORMAL
CALCIUM OXALATE CRYSTALS: PRESENT
CAST: 4 /LPF
COLOR: YELLOW
EPITHELIAL CELLS: 5 /HPF
GLUCOSE QUALITATIVE U: NEGATIVE MG/DL
KETONES URINE: NEGATIVE MG/DL
LEUKOCYTE ESTERASE URINE: ABNORMAL
MICROSCOPIC-UA: NORMAL
NITRITE URINE: NEGATIVE
PH URINE: 6
PROTEIN URINE: 30 MG/DL
RED BLOOD CELLS URINE: 28 /HPF
REVIEW: NORMAL
SPECIFIC GRAVITY URINE: 1.03
UROBILINOGEN URINE: 0.2 MG/DL
WHITE BLOOD CELLS URINE: 57 /HPF

## 2024-09-30 ENCOUNTER — CLINICAL ADVICE (OUTPATIENT)
Age: 59
End: 2024-09-30

## 2024-09-30 ENCOUNTER — TRANSCRIPTION ENCOUNTER (OUTPATIENT)
Age: 59
End: 2024-09-30

## 2024-09-30 LAB — BACTERIA UR CULT: ABNORMAL

## 2024-12-31 DIAGNOSIS — R32 UNSPECIFIED URINARY INCONTINENCE: ICD-10-CM
